# Patient Record
Sex: FEMALE | Race: WHITE | HISPANIC OR LATINO | URBAN - METROPOLITAN AREA
[De-identification: names, ages, dates, MRNs, and addresses within clinical notes are randomized per-mention and may not be internally consistent; named-entity substitution may affect disease eponyms.]

---

## 2020-12-05 ENCOUNTER — EMERGENCY (EMERGENCY)
Facility: HOSPITAL | Age: 25
LOS: 1 days | Discharge: HOME | End: 2020-12-05
Attending: EMERGENCY MEDICINE | Admitting: EMERGENCY MEDICINE
Payer: COMMERCIAL

## 2020-12-05 VITALS
OXYGEN SATURATION: 100 % | HEART RATE: 88 BPM | DIASTOLIC BLOOD PRESSURE: 63 MMHG | RESPIRATION RATE: 16 BRPM | SYSTOLIC BLOOD PRESSURE: 116 MMHG

## 2020-12-05 VITALS
OXYGEN SATURATION: 100 % | WEIGHT: 149.91 LBS | SYSTOLIC BLOOD PRESSURE: 138 MMHG | HEART RATE: 104 BPM | TEMPERATURE: 98 F | RESPIRATION RATE: 18 BRPM | DIASTOLIC BLOOD PRESSURE: 73 MMHG

## 2020-12-05 LAB
ALBUMIN SERPL ELPH-MCNC: 3.9 G/DL — SIGNIFICANT CHANGE UP (ref 3.4–5)
ALP SERPL-CCNC: 93 U/L — SIGNIFICANT CHANGE UP (ref 40–120)
ALT FLD-CCNC: 16 U/L — SIGNIFICANT CHANGE UP (ref 12–42)
ANION GAP SERPL CALC-SCNC: 13 MMOL/L — SIGNIFICANT CHANGE UP (ref 9–16)
AST SERPL-CCNC: 17 U/L — SIGNIFICANT CHANGE UP (ref 15–37)
BASOPHILS # BLD AUTO: 0.08 K/UL — SIGNIFICANT CHANGE UP (ref 0–0.2)
BASOPHILS NFR BLD AUTO: 0.6 % — SIGNIFICANT CHANGE UP (ref 0–2)
BILIRUB SERPL-MCNC: 0.3 MG/DL — SIGNIFICANT CHANGE UP (ref 0.2–1.2)
BUN SERPL-MCNC: 13 MG/DL — SIGNIFICANT CHANGE UP (ref 7–23)
CALCIUM SERPL-MCNC: 8.8 MG/DL — SIGNIFICANT CHANGE UP (ref 8.5–10.5)
CHLORIDE SERPL-SCNC: 105 MMOL/L — SIGNIFICANT CHANGE UP (ref 96–108)
CO2 SERPL-SCNC: 24 MMOL/L — SIGNIFICANT CHANGE UP (ref 22–31)
CREAT SERPL-MCNC: 0.83 MG/DL — SIGNIFICANT CHANGE UP (ref 0.5–1.3)
EOSINOPHIL # BLD AUTO: 0.18 K/UL — SIGNIFICANT CHANGE UP (ref 0–0.5)
EOSINOPHIL NFR BLD AUTO: 1.3 % — SIGNIFICANT CHANGE UP (ref 0–6)
GLUCOSE SERPL-MCNC: 202 MG/DL — HIGH (ref 70–99)
HCT VFR BLD CALC: 38.7 % — SIGNIFICANT CHANGE UP (ref 34.5–45)
HGB BLD-MCNC: 13.3 G/DL — SIGNIFICANT CHANGE UP (ref 11.5–15.5)
IMM GRANULOCYTES NFR BLD AUTO: 0.6 % — SIGNIFICANT CHANGE UP (ref 0–1.5)
LYMPHOCYTES # BLD AUTO: 17.9 % — SIGNIFICANT CHANGE UP (ref 13–44)
LYMPHOCYTES # BLD AUTO: 2.55 K/UL — SIGNIFICANT CHANGE UP (ref 1–3.3)
MCHC RBC-ENTMCNC: 30.2 PG — SIGNIFICANT CHANGE UP (ref 27–34)
MCHC RBC-ENTMCNC: 34.4 GM/DL — SIGNIFICANT CHANGE UP (ref 32–36)
MCV RBC AUTO: 87.8 FL — SIGNIFICANT CHANGE UP (ref 80–100)
MONOCYTES # BLD AUTO: 0.68 K/UL — SIGNIFICANT CHANGE UP (ref 0–0.9)
MONOCYTES NFR BLD AUTO: 4.8 % — SIGNIFICANT CHANGE UP (ref 2–14)
NEUTROPHILS # BLD AUTO: 10.67 K/UL — HIGH (ref 1.8–7.4)
NEUTROPHILS NFR BLD AUTO: 74.8 % — SIGNIFICANT CHANGE UP (ref 43–77)
NRBC # BLD: 0 /100 WBCS — SIGNIFICANT CHANGE UP (ref 0–0)
PLATELET # BLD AUTO: 380 K/UL — SIGNIFICANT CHANGE UP (ref 150–400)
POTASSIUM SERPL-MCNC: 3 MMOL/L — LOW (ref 3.5–5.3)
POTASSIUM SERPL-SCNC: 3 MMOL/L — LOW (ref 3.5–5.3)
PROT SERPL-MCNC: 7.5 G/DL — SIGNIFICANT CHANGE UP (ref 6.4–8.2)
RBC # BLD: 4.41 M/UL — SIGNIFICANT CHANGE UP (ref 3.8–5.2)
RBC # FLD: 13.6 % — SIGNIFICANT CHANGE UP (ref 10.3–14.5)
SODIUM SERPL-SCNC: 142 MMOL/L — SIGNIFICANT CHANGE UP (ref 132–145)
WBC # BLD: 14.25 K/UL — HIGH (ref 3.8–10.5)
WBC # FLD AUTO: 14.25 K/UL — HIGH (ref 3.8–10.5)

## 2020-12-05 PROCEDURE — 99285 EMERGENCY DEPT VISIT HI MDM: CPT

## 2020-12-05 RX ORDER — FAMOTIDINE 10 MG/ML
20 INJECTION INTRAVENOUS ONCE
Refills: 0 | Status: COMPLETED | OUTPATIENT
Start: 2020-12-05 | End: 2020-12-05

## 2020-12-05 RX ORDER — SODIUM CHLORIDE 9 MG/ML
1000 INJECTION INTRAMUSCULAR; INTRAVENOUS; SUBCUTANEOUS ONCE
Refills: 0 | Status: COMPLETED | OUTPATIENT
Start: 2020-12-05 | End: 2020-12-05

## 2020-12-05 RX ORDER — EPINEPHRINE 0.3 MG/.3ML
0.3 INJECTION INTRAMUSCULAR; SUBCUTANEOUS ONCE
Refills: 0 | Status: COMPLETED | OUTPATIENT
Start: 2020-12-05 | End: 2020-12-05

## 2020-12-05 RX ADMIN — FAMOTIDINE 20 MILLIGRAM(S): 10 INJECTION INTRAVENOUS at 16:48

## 2020-12-05 RX ADMIN — EPINEPHRINE 0.3 MILLIGRAM(S): 0.3 INJECTION INTRAMUSCULAR; SUBCUTANEOUS at 20:01

## 2020-12-05 RX ADMIN — SODIUM CHLORIDE 1000 MILLILITER(S): 9 INJECTION INTRAMUSCULAR; INTRAVENOUS; SUBCUTANEOUS at 16:48

## 2020-12-05 NOTE — ED PROVIDER NOTE - PATIENT PORTAL LINK FT
You can access the FollowMyHealth Patient Portal offered by [facility] by registering at the following website: [email or web address]. By joining [facility]’s FollowMyHealth portal, you will also be able to view your health information using other applications (apps) compatible with our system.

## 2020-12-05 NOTE — ED ADULT TRIAGE NOTE - CHIEF COMPLAINT QUOTE
pt states she is allergic to wheat and ate a pretzel. EMS gave benadryl 50mg, 12mg decadron, and epi- pen. Pt at this time states she has no shortness of breath.

## 2020-12-05 NOTE — ED PROVIDER NOTE - NSFOLLOWUPINSTRUCTIONS_ED_ALL_ED_FT
avoid eating food your allergic to    use medications as directed     wear mask / social distance    return to ER for worsening symptoms

## 2020-12-05 NOTE — ED PROVIDER NOTE - CLINICAL SUMMARY MEDICAL DECISION MAKING FREE TEXT BOX
The patient's symptoms progressively improved throughout the ED stay.  The patient tolerated PO fluids.  ED evaluation and management discussed with the patient and family (if available) in detail.  Close PMD and/or specialist follow up encouraged.  Strict ED return instructions discussed in detail for any worsening or new symptoms. The patient was given the opportunity to ask any questions about their discharge diagnosis and discharge instructions. The patient verbalized understanding of these instructions and need to return to the ED for any worsening of illness or for any concern. The patient received a printed version of the discharge instructions. The patient understands the Emergency Department diagnosis is a preliminary diagnosis often based on limited information and that the patient must adhere to the follow-up plan as discussed.  At the time of discharge from the Emergency Department, the patient is alert with fluent appropriate speech and ambulatory without difficulty.  A medical screening examination was performed and no emergency medical condition was identified.  dc home with boyfriend who was present throughout the ed stay

## 2020-12-05 NOTE — ED ADULT NURSE NOTE - OBJECTIVE STATEMENT
Pt BIBA c/o allergic reaction.  Pt airway patent, denies any SOB, itching or anxiety at this time.  Pt received 10mg decadron, 50mg of benadryl and x1 epi-pen PTA.  Pt w/ fluids in progress, on CCM.  Will continue to monitor.

## 2020-12-05 NOTE — ED PROVIDER NOTE - OBJECTIVE STATEMENT
Pt, Mark Armas) on the phone, and myself were in the room during the evaluation. Triage VS: HR:103  BP: 104/70 Temp: 99 SpO2: 100 Triage note: "pt states she is allergic to wheat and ate a pretzel. EMS gave benadryl 50mg, 12mg decadron, and epi- pen. Pt at this time states she has no shortness of breath."    26 y/o female with no pertinent PMHX is BIB EMS to ED accompanied by boyfriend c/o chest tightening, vomiting and SOB s/p an allergic reaction after ingestion of wheat based substance. Pt states she was eating a pretzel and elijah beer, both of which have wheat and pt is known to be allergic to, at a restaurant outside. Pt states she started to feel chest tightening, vomiting, and SOB and called EMS. Pt was administered an Epi Pen and steroids by EMS. Pt states her symptoms have significantly subsided, except her throat still feels tight. Pt denies fever, chills, abdominal pain, diarrhea, or rashes

## 2020-12-05 NOTE — ED ADULT NURSE NOTE - CHPI ED NUR SYMPTOMS NEG
no difficulty swallowing/no congestion/no shortness of breath/no swelling of face, tongue/no vomiting/no nausea/no rash/no throat itching/no difficulty breathing/no wheezing

## 2022-11-01 NOTE — ED PROVIDER NOTE - CPE EDP EYES NORM
11/2 @ 4:00    
Left message to return call to clinic to review letter, labs & covid entered. Orders entered, forwarded to [facility] to send patient a copy.   
No Email  
normal...